# Patient Record
Sex: MALE | Race: WHITE
[De-identification: names, ages, dates, MRNs, and addresses within clinical notes are randomized per-mention and may not be internally consistent; named-entity substitution may affect disease eponyms.]

---

## 2018-08-02 PROBLEM — Z00.00 ENCOUNTER FOR PREVENTIVE HEALTH EXAMINATION: Status: ACTIVE | Noted: 2018-08-02

## 2018-08-20 ENCOUNTER — RESULT REVIEW (OUTPATIENT)
Age: 43
End: 2018-08-20

## 2018-08-30 ENCOUNTER — APPOINTMENT (OUTPATIENT)
Dept: UROLOGY | Facility: CLINIC | Age: 43
End: 2018-08-30
Payer: COMMERCIAL

## 2018-08-30 VITALS
WEIGHT: 178 LBS | DIASTOLIC BLOOD PRESSURE: 87 MMHG | SYSTOLIC BLOOD PRESSURE: 124 MMHG | BODY MASS INDEX: 27.94 KG/M2 | HEIGHT: 67 IN | HEART RATE: 102 BPM

## 2018-08-30 DIAGNOSIS — Z80.0 FAMILY HISTORY OF MALIGNANT NEOPLASM OF DIGESTIVE ORGANS: ICD-10-CM

## 2018-08-30 DIAGNOSIS — Z86.010 PERSONAL HISTORY OF COLONIC POLYPS: ICD-10-CM

## 2018-08-30 DIAGNOSIS — Q60.0 RENAL AGENESIS, UNILATERAL: ICD-10-CM

## 2018-08-30 DIAGNOSIS — Z78.9 OTHER SPECIFIED HEALTH STATUS: ICD-10-CM

## 2018-08-30 DIAGNOSIS — E78.1 PURE HYPERGLYCERIDEMIA: ICD-10-CM

## 2018-08-30 LAB
BILIRUB UR QL STRIP: NORMAL
CLARITY UR: CLEAR
COLLECTION METHOD: NORMAL
GLUCOSE UR-MCNC: NORMAL
HCG UR QL: 1 EU/DL
HGB UR QL STRIP.AUTO: NORMAL
KETONES UR-MCNC: NORMAL
LEUKOCYTE ESTERASE UR QL STRIP: NORMAL
NITRITE UR QL STRIP: NORMAL
PH UR STRIP: 6.5
PROT UR STRIP-MCNC: NORMAL
SP GR UR STRIP: 1.02

## 2018-08-30 PROCEDURE — 81003 URINALYSIS AUTO W/O SCOPE: CPT | Mod: QW

## 2018-08-30 PROCEDURE — 99204 OFFICE O/P NEW MOD 45 MIN: CPT | Mod: 25

## 2018-08-30 PROCEDURE — 51798 US URINE CAPACITY MEASURE: CPT

## 2018-09-07 ENCOUNTER — OUTPATIENT (OUTPATIENT)
Dept: OUTPATIENT SERVICES | Facility: HOSPITAL | Age: 43
LOS: 1 days | End: 2018-09-07
Payer: COMMERCIAL

## 2018-09-07 ENCOUNTER — APPOINTMENT (OUTPATIENT)
Dept: ULTRASOUND IMAGING | Facility: CLINIC | Age: 43
End: 2018-09-07
Payer: COMMERCIAL

## 2018-09-07 DIAGNOSIS — Z00.8 ENCOUNTER FOR OTHER GENERAL EXAMINATION: ICD-10-CM

## 2018-09-07 PROCEDURE — 76870 US EXAM SCROTUM: CPT

## 2018-09-07 PROCEDURE — 76870 US EXAM SCROTUM: CPT | Mod: 26

## 2019-11-07 ENCOUNTER — APPOINTMENT (OUTPATIENT)
Dept: UROLOGY | Facility: CLINIC | Age: 44
End: 2019-11-07
Payer: COMMERCIAL

## 2019-11-07 VITALS
WEIGHT: 180 LBS | HEART RATE: 102 BPM | BODY MASS INDEX: 28.25 KG/M2 | HEIGHT: 67 IN | SYSTOLIC BLOOD PRESSURE: 125 MMHG | RESPIRATION RATE: 14 BRPM | DIASTOLIC BLOOD PRESSURE: 87 MMHG

## 2019-11-07 DIAGNOSIS — N50.812 LEFT TESTICULAR PAIN: ICD-10-CM

## 2019-11-07 PROCEDURE — 99214 OFFICE O/P EST MOD 30 MIN: CPT

## 2019-11-11 NOTE — PHYSICAL EXAM
[Normal Appearance] : normal appearance [General Appearance - Well Developed] : well developed [General Appearance - In No Acute Distress] : no acute distress [Abdomen Mass (___ Cm)] : no abdominal mass palpated [Abdomen Soft] : soft [Abdomen Tenderness] : non-tender [Costovertebral Angle Tenderness] : no ~M costovertebral angle tenderness [Urethral Meatus] : meatus normal [Penis Abnormality] : normal circumcised penis [Scrotum] : the scrotum was normal [Epididymis] : the epididymides were normal [Testes Mass (___cm)] : there were no testicular masses [Prostate Tenderness] : the prostate was not tender [Prostate Size ___ gm] : prostate size [unfilled] gm [No Prostate Nodules] : no prostate nodules [Skin Color & Pigmentation] : normal skin color and pigmentation [FreeTextEntry1] : normal peripheral circulation  [] : no respiratory distress [Normal Station and Gait] : the gait and station were normal for the patient's age [Oriented To Time, Place, And Person] : oriented to person, place, and time [No Palpable Adenopathy] : no palpable adenopathy [No Focal Deficits] : no focal deficits

## 2019-11-11 NOTE — HISTORY OF PRESENT ILLNESS
[FreeTextEntry1] : 43 yo male presents for follow up. \par Reports good stream, urinates every few hours or so during the day depending on fluid: water, coffee/tea and soda intake. Nocturia of 1 x. \par Denies hesitancy, straining, intermittency, urgency, incontinence, sense of incomplete emptying.\par Denies dysuria, hematuria, lower abdominal or flank pain, fever, chills or rigors.\par Has family history Prostate cancer- Father, . \par Denies any recent unintentional weight loss, night sweats and new bone or back pain. \par Still has off and on left testis pain. \par \par Has history of left varicocele and hernia surgery when 8 yrs of age. \par Has solitary kidney. Did left donor nephrectomy for his sister. \par \par

## 2019-11-11 NOTE — PHYSICAL EXAM
[General Appearance - Well Developed] : well developed [Normal Appearance] : normal appearance [General Appearance - In No Acute Distress] : no acute distress [Abdomen Tenderness] : non-tender [Abdomen Soft] : soft [Abdomen Mass (___ Cm)] : no abdominal mass palpated [Costovertebral Angle Tenderness] : no ~M costovertebral angle tenderness [Penis Abnormality] : normal circumcised penis [Urethral Meatus] : meatus normal [Scrotum] : the scrotum was normal [Epididymis] : the epididymides were normal [Testes Mass (___cm)] : there were no testicular masses [Prostate Tenderness] : the prostate was not tender [No Prostate Nodules] : no prostate nodules [Prostate Size ___ gm] : prostate size [unfilled] gm [FreeTextEntry1] : normal peripheral circulation  [] : no respiratory distress [Skin Color & Pigmentation] : normal skin color and pigmentation [Oriented To Time, Place, And Person] : oriented to person, place, and time [Normal Station and Gait] : the gait and station were normal for the patient's age [No Focal Deficits] : no focal deficits [No Palpable Adenopathy] : no palpable adenopathy

## 2019-11-11 NOTE — LETTER BODY
[Please see my note below.] : Please see my note below. [Courtesy Letter:] : I had the pleasure of seeing your patient, [unfilled], in my office today. [Dear  ___] : Dear  [unfilled], [Sincerely,] : Sincerely, [FreeTextEntry3] : Eliot Cali MD\par  of Urology\par [facility]\par \par Offices:\par [facility] @\par [address], [place] [address]\par and\par [address], [place] [address], [address]\par and\par [address] [address]\par \par TEL: [phone]\par FAX: [phone]

## 2019-11-11 NOTE — HISTORY OF PRESENT ILLNESS
[FreeTextEntry1] : 45 yo male presents for follow up. \par Reports good stream, urinates every few hours or so during the day depending on fluid: water, coffee/tea and soda intake. Nocturia of 1 x. \par Denies hesitancy, straining, intermittency, urgency, incontinence, sense of incomplete emptying.\par Denies dysuria, hematuria, lower abdominal or flank pain, fever, chills or rigors.\par Has family history Prostate cancer- Father, . \par Denies any recent unintentional weight loss, night sweats and new bone or back pain. \par Still has off and on left testis pain. \par \par Has history of left varicocele and hernia surgery when 8 yrs of age. \par Has solitary kidney. Did left donor nephrectomy for his sister. \par \par

## 2019-11-11 NOTE — ASSESSMENT
[FreeTextEntry1] : Benign Prostatic Hyperplasia:\par No bothersome lower urinary tract symptoms and minimal post void residual. \par \par Left testis pain:\par Discussed Scrotal Ultrasound: Right trace Hydrocele. \par \par PSA Screening:\par Repeat PSA pending. Will do with Primary care physician and inform us. \par \par Return to office in 1 year or sooner if any issues- will do Uroflo and PVR.

## 2019-11-11 NOTE — LETTER BODY
[Courtesy Letter:] : I had the pleasure of seeing your patient, [unfilled], in my office today. [Please see my note below.] : Please see my note below. [Dear  ___] : Dear  [unfilled], [Sincerely,] : Sincerely, [FreeTextEntry3] : Eliot Cali MD\par  of Urology\par [facility]\par \par Offices:\par [facility] @\par [address], [place] [address]\par and\par [address], [place] [address], [address]\par and\par [address] [address]\par \par TEL: [phone]\par FAX: [phone]

## 2020-12-22 ENCOUNTER — APPOINTMENT (OUTPATIENT)
Dept: UROLOGY | Facility: CLINIC | Age: 45
End: 2020-12-22
Payer: COMMERCIAL

## 2020-12-22 VITALS — DIASTOLIC BLOOD PRESSURE: 100 MMHG | TEMPERATURE: 98.2 F | HEART RATE: 99 BPM | SYSTOLIC BLOOD PRESSURE: 160 MMHG

## 2020-12-22 PROCEDURE — 99214 OFFICE O/P EST MOD 30 MIN: CPT

## 2020-12-22 PROCEDURE — 99072 ADDL SUPL MATRL&STAF TM PHE: CPT

## 2020-12-30 NOTE — LETTER BODY
[Dear  ___] : Dear  [unfilled], [Courtesy Letter:] : I had the pleasure of seeing your patient, [unfilled], in my office today. [Please see my note below.] : Please see my note below. [Sincerely,] : Sincerely, [FreeTextEntry3] : Eliot Cali MD\par  of Urology\par [facility]\par \par Offices:\par [facility] @\par [address], [place] [address]\par and\par [address], [place] [address], [address]\par and\par [address] [address]\par \par TEL: [phone]\par FAX: [phone]

## 2020-12-30 NOTE — HISTORY OF PRESENT ILLNESS
[FreeTextEntry1] : 44 yo male presents for follow up. \par Had colonoscopy was told has enlarged prostate. \par Prescribed Flomax, did not start. \par Urinates with weaker stream, every few hours or so during the day and nocturia 2-3 x\par Denies dysuria, hematuria, lower abdominal or flank pain, nausea, vomiting, fever, chills or rigors. \par Normal erections and libido. \par \par Has family history Prostate cancer- Father, . \par \par Has history of left varicocele surgery when 8 yo and hernia surgery in . \par Has solitary kidney. Did left donor nephrectomy for his sister. \par \par

## 2020-12-30 NOTE — PHYSICAL EXAM
[Urethral Meatus] : meatus normal [Penis Abnormality] : normal circumcised penis [Scrotum] : the scrotum was normal [Epididymis] : the epididymides were normal [Testes Mass (___cm)] : there were no testicular masses [Prostate Tenderness] : the prostate was not tender [No Prostate Nodules] : no prostate nodules [Prostate Size ___ gm] : prostate size [unfilled] gm [Normal Appearance] : normal appearance [General Appearance - In No Acute Distress] : no acute distress [Abdomen Soft] : soft [Abdomen Tenderness] : non-tender [Costovertebral Angle Tenderness] : no ~M costovertebral angle tenderness [Skin Color & Pigmentation] : normal skin color and pigmentation [FreeTextEntry1] : normal peripheral circulation  [] : no respiratory distress [Oriented To Time, Place, And Person] : oriented to person, place, and time [Normal Station and Gait] : the gait and station were normal for the patient's age [No Focal Deficits] : no focal deficits

## 2020-12-30 NOTE — ASSESSMENT
[FreeTextEntry1] : Benign Prostatic Hyperplasia:\par Discussed treatment options. \par Prescribed Flomax. Explained common side effects: nasal congestion, cough, muscle pain, back pain, dizziness, orthostatic hypotension, somnolence, retrograde ejaculation, decreased libido and erectile dysfunction.\par  \par PSA Screening:\par Discussed PSA screening and latest recommendations/guidelines- USPTF and AUA. \par PSA before appointment. \par \par Return to office in 2 months or sooner if any issues- will do Uroflo/PVR.

## 2021-02-16 ENCOUNTER — APPOINTMENT (OUTPATIENT)
Dept: UROLOGY | Facility: CLINIC | Age: 46
End: 2021-02-16

## 2021-02-23 ENCOUNTER — APPOINTMENT (OUTPATIENT)
Dept: UROLOGY | Facility: CLINIC | Age: 46
End: 2021-02-23
Payer: COMMERCIAL

## 2021-02-23 DIAGNOSIS — R35.1 NOCTURIA: ICD-10-CM

## 2021-02-23 PROCEDURE — 99072 ADDL SUPL MATRL&STAF TM PHE: CPT

## 2021-02-23 PROCEDURE — 51741 ELECTRO-UROFLOWMETRY FIRST: CPT

## 2021-02-23 PROCEDURE — 99213 OFFICE O/P EST LOW 20 MIN: CPT | Mod: 25

## 2021-02-23 PROCEDURE — 51798 US URINE CAPACITY MEASURE: CPT | Mod: 59

## 2021-02-24 PROBLEM — R35.1 NOCTURIA: Status: ACTIVE | Noted: 2018-08-30

## 2021-02-24 NOTE — PHYSICAL EXAM
[Normal Appearance] : normal appearance [General Appearance - In No Acute Distress] : no acute distress [Abdomen Soft] : soft [Abdomen Tenderness] : non-tender [Skin Color & Pigmentation] : normal skin color and pigmentation [FreeTextEntry1] : normal peripheral circulation  [] : no respiratory distress [Oriented To Time, Place, And Person] : oriented to person, place, and time

## 2021-02-24 NOTE — ASSESSMENT
[FreeTextEntry1] : Benign Prostatic Hyperplasia:\par Nocturia:\par See Uroflo and PVR. \par Discussed treatment options. Offered to change Flomax to Alfuzosin. \par Will restart Flomax. \par \par Return to office in 3 months or sooner if any issues- will do Uroflo/PVR \par

## 2021-02-24 NOTE — HISTORY OF PRESENT ILLNESS
[FreeTextEntry1] : 44 yo male presents for follow up. \par Took Flomax until 4 days ago, stopped as he had developed itching and red spots- not better. \par Did not notice change in flow but was nocturia 1 x. \par \par Recently was seen for enlarged prostate. \par Had colonoscopy was told has enlarged prostate. \par Prescribed Flomax, did not start. \par Urinated with weaker stream, every few hours or so during the day and nocturia 2-3 x\par Denied dysuria, hematuria, lower abdominal or flank pain, nausea, vomiting, fever, chills or rigors. \par Normal erections and libido. \par \par Has family history Prostate cancer- Father, . \par \par Has history of left varicocele surgery when 8 yo and hernia surgery in . \par Has solitary kidney. Did left donor nephrectomy for his sister. \par \par

## 2021-03-12 ENCOUNTER — APPOINTMENT (OUTPATIENT)
Dept: PULMONOLOGY | Facility: CLINIC | Age: 46
End: 2021-03-12
Payer: COMMERCIAL

## 2021-03-12 VITALS
SYSTOLIC BLOOD PRESSURE: 140 MMHG | HEART RATE: 107 BPM | HEIGHT: 67 IN | WEIGHT: 182 LBS | BODY MASS INDEX: 28.56 KG/M2 | DIASTOLIC BLOOD PRESSURE: 80 MMHG | OXYGEN SATURATION: 97 %

## 2021-03-12 PROCEDURE — 99204 OFFICE O/P NEW MOD 45 MIN: CPT

## 2021-03-12 PROCEDURE — 99072 ADDL SUPL MATRL&STAF TM PHE: CPT

## 2021-03-17 NOTE — CONSULT LETTER
[Dear  ___] : Dear  [unfilled], [FreeTextEntry1] : I had the pleasure of evaluating your patient, DESIRE LOPEZ , in the office today.  Please review my consultation and evaluation report that follows below.  Please do not hesitate to call me if further information is necessary or if you wish to discuss ongoing care or diagnostic work-up.   \par I very much appreciate your referral and it is a privilege to be able to provide care for your patient.\par \par Sincerely,\par  \par Bhargav Davidson MD, MHCM, FACP, ANDRESSA-C\par Pulmonary Medicine\par  of Medicine\par [facility]\par [email or web address]\par \par [facility] in [place]\par [address]\[place]  [address]\par    Fax \par \par [facility] at [place]\par [address]\par [place], [place] \par \par

## 2021-03-17 NOTE — ASSESSMENT
[FreeTextEntry1] : Very pleasant gentleman with hx HLD and is sister has alpha1 antitryp with pulmonary/heaptic disease, also renal failure and colon cancer\par \par He is a nonsmoker with no pulmonary complaints, no hx of liver disease\par \par Measured alpha one is low (77)\par Suspected abnormalities of his lung parenchymal\par \par At this time, it is unclear whether he has alph one deficiency  capable of causing disease \par Additionally, time course of development of disease is frequently variable.\par Need dedicated CT chest for further evaluation\par Request PFTs\par RTC one month

## 2021-03-17 NOTE — HISTORY OF PRESENT ILLNESS
[TextBox_4] : 45 yo man presents for evaluation of probable alpha one antitrypsin disease\par \par Patients sister has alpha 1, has emphysema and liver function abn, sp renal transplant\par His alpha one level is 77 mg/deci\par \par Non smoker with no history of pulmonary disease\par  \par S/p renal donation to sister\par \par Hx hypertriglycerides\par \par Recent cardiac exam showed neg ECHO and Ca score negative\par Review of CT heart suggests small emphysema changes on visible lung\par \par Patient  with step children\par Owns a pool complany\par Active, exercises

## 2021-04-13 ENCOUNTER — APPOINTMENT (OUTPATIENT)
Dept: DISASTER EMERGENCY | Facility: CLINIC | Age: 46
End: 2021-04-13

## 2021-04-14 LAB — SARS-COV-2 N GENE NPH QL NAA+PROBE: NOT DETECTED

## 2021-04-16 ENCOUNTER — APPOINTMENT (OUTPATIENT)
Dept: PULMONOLOGY | Facility: CLINIC | Age: 46
End: 2021-04-16
Payer: COMMERCIAL

## 2021-04-16 VITALS — WEIGHT: 180 LBS | TEMPERATURE: 98.3 F | BODY MASS INDEX: 28.59 KG/M2 | HEIGHT: 66.5 IN

## 2021-04-16 VITALS
RESPIRATION RATE: 16 BRPM | OXYGEN SATURATION: 97 % | DIASTOLIC BLOOD PRESSURE: 90 MMHG | SYSTOLIC BLOOD PRESSURE: 124 MMHG | HEART RATE: 88 BPM

## 2021-04-16 DIAGNOSIS — E88.01 ALPHA-1-ANTITRYPSIN DEFICIENCY: ICD-10-CM

## 2021-04-16 DIAGNOSIS — Z01.818 ENCOUNTER FOR OTHER PREPROCEDURAL EXAMINATION: ICD-10-CM

## 2021-04-16 PROCEDURE — 94010 BREATHING CAPACITY TEST: CPT

## 2021-04-16 PROCEDURE — 99072 ADDL SUPL MATRL&STAF TM PHE: CPT

## 2021-04-16 PROCEDURE — 99214 OFFICE O/P EST MOD 30 MIN: CPT | Mod: 25

## 2021-04-16 PROCEDURE — 94727 GAS DIL/WSHOT DETER LNG VOL: CPT

## 2021-04-16 PROCEDURE — 94729 DIFFUSING CAPACITY: CPT

## 2021-04-16 PROCEDURE — 85018 HEMOGLOBIN: CPT | Mod: QW

## 2021-04-16 RX ORDER — ATORVASTATIN CALCIUM 20 MG/1
20 TABLET, FILM COATED ORAL
Refills: 0 | Status: ACTIVE | COMMUNITY

## 2021-04-16 RX ORDER — TAMSULOSIN HYDROCHLORIDE 0.4 MG/1
0.4 CAPSULE ORAL
Refills: 0 | Status: ACTIVE | COMMUNITY

## 2021-04-16 RX ORDER — FENOFIBRATE 145 MG/1
TABLET ORAL
Refills: 0 | Status: DISCONTINUED | COMMUNITY
End: 2021-04-16

## 2021-04-16 NOTE — CONSULT LETTER
[Dear  ___] : Dear  [unfilled], [FreeTextEntry1] : I had the pleasure of evaluating your patient, DESIRE LOPEZ , in the office today.  Please review my consultation and evaluation report that follows below.  Please do not hesitate to call me if further information is necessary or if you wish to discuss ongoing care or diagnostic work-up.   \par I very much appreciate your referral and it is a privilege to be able to provide care for your patient.\par \par Sincerely,\par  \par Bhargav Davidson MD, MHCM, FACP, ANDRESSA-C\par Pulmonary Medicine\par  of Medicine\par [facility]\par [email or web address]\par \par [facility] in [place]\par [address]\[place]  [address]\par    Fax \par \par [facility] at [place]\par [address]\par [place], [place] \par \par

## 2021-04-16 NOTE — ASSESSMENT
[FreeTextEntry1] : 47 yo man with FH of alpha one antitrypsin\par Patient donated kidney to his sister\par Noted to have low alpha one levels  70 mg/dl\par \par PFTs obtained today are NORMAl, no restriction or obstruction\par Full CT chest shows no evidence of emphysematous changes\par \par Note that clinical pulmonary disease not seen until alpha one is less than 57 mg/dl\par Patient is a nonsmoker\par \par At this time, altho the patient may be heterozygous for alpha one abnormality, he has no evidence of pulmonary dysfunction\par No further w/u is indicated\par Will be most happy to see again as needed

## 2021-04-16 NOTE — HISTORY OF PRESENT ILLNESS
[TextBox_4] : Very pleasant gentleman whose sister has clinical alpha 1 def and he was noted to have low levels  (77 mg/dl)\par Recent brought back for PFTs and to review Chest CT

## 2021-05-18 ENCOUNTER — APPOINTMENT (OUTPATIENT)
Dept: UROLOGY | Facility: CLINIC | Age: 46
End: 2021-05-18
Payer: COMMERCIAL

## 2021-05-18 VITALS — TEMPERATURE: 97.3 F

## 2021-05-18 DIAGNOSIS — N13.8 BENIGN PROSTATIC HYPERPLASIA WITH LOWER URINARY TRACT SYMPMS: ICD-10-CM

## 2021-05-18 DIAGNOSIS — Z12.5 ENCOUNTER FOR SCREENING FOR MALIGNANT NEOPLASM OF PROSTATE: ICD-10-CM

## 2021-05-18 DIAGNOSIS — N40.1 BENIGN PROSTATIC HYPERPLASIA WITH LOWER URINARY TRACT SYMPMS: ICD-10-CM

## 2021-05-18 PROCEDURE — 51741 ELECTRO-UROFLOWMETRY FIRST: CPT

## 2021-05-18 PROCEDURE — 51798 US URINE CAPACITY MEASURE: CPT

## 2021-05-18 PROCEDURE — 99213 OFFICE O/P EST LOW 20 MIN: CPT | Mod: 25

## 2021-05-24 PROBLEM — Z12.5 SCREENING PSA (PROSTATE SPECIFIC ANTIGEN): Status: ACTIVE | Noted: 2018-08-30

## 2021-05-24 PROBLEM — N40.1 BPH WITH OBSTRUCTION/LOWER URINARY TRACT SYMPTOMS: Status: ACTIVE | Noted: 2018-08-30

## 2021-05-24 NOTE — HISTORY OF PRESENT ILLNESS
[FreeTextEntry1] : 47 yo male presents for follow up. \par Taking Flomax. Nocturia 1 x earlier 2-3 x. \par When drinks coffee urinates every hour or so, otherwise every 3-4 hours.\par \par Recently was seen for enlarged prostate. \par Had colonoscopy was told has enlarged prostate. \par Prescribed Flomax, did not start. \par Urinated with weaker stream, every few hours or so during the day and nocturia 2-3 x\par Denied dysuria, hematuria, lower abdominal or flank pain, nausea, vomiting, fever, chills or rigors. \par Normal erections and libido. \par \par Has family history Prostate cancer- Father, . \par \par Has history of left varicocele surgery when 8 yo and hernia surgery in . \par Has solitary kidney. Did left donor nephrectomy for his sister. \par \par

## 2021-05-24 NOTE — LETTER BODY
[Dear  ___] : Dear  [unfilled], [Courtesy Letter:] : I had the pleasure of seeing your patient, [unfilled], in my office today. [Please see my note below.] : Please see my note below. [Sincerely,] : Sincerely, [FreeTextEntry3] : Eliot Cali MD\par  of Urology\par [facility]\par \par Offices:\par [facility] @\par [address], [place] [address]\par and\par [address] [place] [address], [address]\par and\par [address] [address]\par \par TEL: [phone]\par FAX: [phone]

## 2021-05-24 NOTE — ASSESSMENT
[FreeTextEntry1] : Benign Prostatic Hyperplasia:\par See Uroflo and PVR. \par Not sure if he wants to continue Flomax. Will think about it.\par \par PSA Screening:\par WIll do PSA with PCP. \par \par Return to office in Dec 2021 or sooner if any issues- will do Uroflo/PVR. \par \par

## 2021-12-14 ENCOUNTER — APPOINTMENT (OUTPATIENT)
Dept: UROLOGY | Facility: CLINIC | Age: 46
End: 2021-12-14

## 2024-12-13 ENCOUNTER — APPOINTMENT (OUTPATIENT)
Dept: UROLOGY | Facility: CLINIC | Age: 49
End: 2024-12-13
Payer: COMMERCIAL

## 2024-12-13 VITALS
BODY MASS INDEX: 28.72 KG/M2 | OXYGEN SATURATION: 98 % | HEIGHT: 67 IN | DIASTOLIC BLOOD PRESSURE: 99 MMHG | RESPIRATION RATE: 17 BRPM | HEART RATE: 90 BPM | SYSTOLIC BLOOD PRESSURE: 145 MMHG | WEIGHT: 183 LBS

## 2024-12-13 DIAGNOSIS — N13.8 BENIGN PROSTATIC HYPERPLASIA WITH LOWER URINARY TRACT SYMPMS: ICD-10-CM

## 2024-12-13 DIAGNOSIS — R39.15 URGENCY OF URINATION: ICD-10-CM

## 2024-12-13 DIAGNOSIS — N40.1 BENIGN PROSTATIC HYPERPLASIA WITH LOWER URINARY TRACT SYMPMS: ICD-10-CM

## 2024-12-13 DIAGNOSIS — R33.9 RETENTION OF URINE, UNSPECIFIED: ICD-10-CM

## 2024-12-13 DIAGNOSIS — Z12.5 ENCOUNTER FOR SCREENING FOR MALIGNANT NEOPLASM OF PROSTATE: ICD-10-CM

## 2024-12-13 PROCEDURE — 99204 OFFICE O/P NEW MOD 45 MIN: CPT

## 2024-12-13 RX ORDER — TAMSULOSIN HYDROCHLORIDE 0.4 MG/1
0.4 CAPSULE ORAL
Qty: 90 | Refills: 1 | Status: ACTIVE | COMMUNITY
Start: 2024-12-13 | End: 1900-01-01

## 2025-04-09 ENCOUNTER — APPOINTMENT (OUTPATIENT)
Dept: UROLOGY | Facility: CLINIC | Age: 50
End: 2025-04-09
Payer: COMMERCIAL

## 2025-04-09 VITALS
HEIGHT: 67.5 IN | SYSTOLIC BLOOD PRESSURE: 141 MMHG | WEIGHT: 181 LBS | HEART RATE: 98 BPM | RESPIRATION RATE: 17 BRPM | OXYGEN SATURATION: 97 % | BODY MASS INDEX: 28.08 KG/M2 | DIASTOLIC BLOOD PRESSURE: 102 MMHG

## 2025-04-09 DIAGNOSIS — Z12.5 ENCOUNTER FOR SCREENING FOR MALIGNANT NEOPLASM OF PROSTATE: ICD-10-CM

## 2025-04-09 PROCEDURE — 51741 ELECTRO-UROFLOWMETRY FIRST: CPT

## 2025-04-09 PROCEDURE — 99214 OFFICE O/P EST MOD 30 MIN: CPT | Mod: 25

## 2025-04-11 ENCOUNTER — APPOINTMENT (OUTPATIENT)
Dept: ULTRASOUND IMAGING | Facility: CLINIC | Age: 50
End: 2025-04-11

## 2025-04-11 ENCOUNTER — OUTPATIENT (OUTPATIENT)
Dept: OUTPATIENT SERVICES | Facility: HOSPITAL | Age: 50
LOS: 1 days | End: 2025-04-11
Payer: COMMERCIAL

## 2025-04-11 DIAGNOSIS — R33.9 RETENTION OF URINE, UNSPECIFIED: ICD-10-CM

## 2025-04-11 DIAGNOSIS — N40.1 BENIGN PROSTATIC HYPERPLASIA WITH LOWER URINARY TRACT SYMPTOMS: ICD-10-CM

## 2025-04-11 LAB
ANION GAP SERPL CALC-SCNC: 14 MMOL/L
APPEARANCE: CLEAR
BACTERIA UR CULT: NORMAL
BACTERIA: NEGATIVE /HPF
BILIRUBIN URINE: NEGATIVE
BLOOD URINE: NEGATIVE
BUN SERPL-MCNC: 21 MG/DL
CALCIUM SERPL-MCNC: 9.8 MG/DL
CAST: 0 /LPF
CHLORIDE SERPL-SCNC: 103 MMOL/L
CO2 SERPL-SCNC: 26 MMOL/L
COLOR: YELLOW
CREAT SERPL-MCNC: 1.6 MG/DL
EGFRCR SERPLBLD CKD-EPI 2021: 52 ML/MIN/1.73M2
EPITHELIAL CELLS: 0 /HPF
GLUCOSE QUALITATIVE U: NEGATIVE MG/DL
GLUCOSE SERPL-MCNC: 83 MG/DL
KETONES URINE: NEGATIVE MG/DL
LEUKOCYTE ESTERASE URINE: NEGATIVE
MICROSCOPIC-UA: NORMAL
NITRITE URINE: NEGATIVE
PH URINE: 6.5
POTASSIUM SERPL-SCNC: 4.9 MMOL/L
PROTEIN URINE: NEGATIVE MG/DL
PSA SERPL-MCNC: 3.03 NG/ML
RED BLOOD CELLS URINE: 0 /HPF
SODIUM SERPL-SCNC: 143 MMOL/L
SPECIFIC GRAVITY URINE: 1.01
UROBILINOGEN URINE: 0.2 MG/DL
WHITE BLOOD CELLS URINE: 0 /HPF

## 2025-04-11 PROCEDURE — 76770 US EXAM ABDO BACK WALL COMP: CPT | Mod: 26

## 2025-04-11 PROCEDURE — 76770 US EXAM ABDO BACK WALL COMP: CPT

## 2025-04-16 ENCOUNTER — APPOINTMENT (OUTPATIENT)
Dept: UROLOGY | Facility: CLINIC | Age: 50
End: 2025-04-16
Payer: COMMERCIAL

## 2025-04-16 VITALS
HEART RATE: 118 BPM | BODY MASS INDEX: 28.25 KG/M2 | SYSTOLIC BLOOD PRESSURE: 155 MMHG | WEIGHT: 180 LBS | RESPIRATION RATE: 17 BRPM | DIASTOLIC BLOOD PRESSURE: 110 MMHG | OXYGEN SATURATION: 97 % | HEIGHT: 67 IN

## 2025-04-16 DIAGNOSIS — N13.8 BENIGN PROSTATIC HYPERPLASIA WITH LOWER URINARY TRACT SYMPMS: ICD-10-CM

## 2025-04-16 DIAGNOSIS — N40.1 BENIGN PROSTATIC HYPERPLASIA WITH LOWER URINARY TRACT SYMPMS: ICD-10-CM

## 2025-04-16 DIAGNOSIS — R97.20 ELEVATED PROSTATE, SPECIFIC ANTIGEN [PSA]: ICD-10-CM

## 2025-04-16 DIAGNOSIS — R33.9 RETENTION OF URINE, UNSPECIFIED: ICD-10-CM

## 2025-04-16 PROCEDURE — 99214 OFFICE O/P EST MOD 30 MIN: CPT | Mod: 25

## 2025-04-16 PROCEDURE — 52000 CYSTOURETHROSCOPY: CPT

## 2025-04-29 ENCOUNTER — APPOINTMENT (OUTPATIENT)
Dept: MRI IMAGING | Facility: CLINIC | Age: 50
End: 2025-04-29

## 2025-04-29 ENCOUNTER — OUTPATIENT (OUTPATIENT)
Dept: OUTPATIENT SERVICES | Facility: HOSPITAL | Age: 50
LOS: 1 days | End: 2025-04-29
Payer: COMMERCIAL

## 2025-04-29 ENCOUNTER — RESULT REVIEW (OUTPATIENT)
Age: 50
End: 2025-04-29

## 2025-04-29 DIAGNOSIS — R97.20 ELEVATED PROSTATE SPECIFIC ANTIGEN [PSA]: ICD-10-CM

## 2025-04-29 PROCEDURE — 76498 UNLISTED MR PROCEDURE: CPT

## 2025-04-29 PROCEDURE — 72197 MRI PELVIS W/O & W/DYE: CPT | Mod: 26

## 2025-04-29 PROCEDURE — A9585: CPT

## 2025-04-29 PROCEDURE — 76498P: CUSTOM | Mod: 26

## 2025-04-29 PROCEDURE — 72197 MRI PELVIS W/O & W/DYE: CPT

## 2025-05-02 ENCOUNTER — OUTPATIENT (OUTPATIENT)
Dept: OUTPATIENT SERVICES | Facility: HOSPITAL | Age: 50
LOS: 1 days | End: 2025-05-02
Payer: COMMERCIAL

## 2025-05-02 VITALS
HEART RATE: 90 BPM | HEIGHT: 67.5 IN | DIASTOLIC BLOOD PRESSURE: 92 MMHG | WEIGHT: 176.37 LBS | OXYGEN SATURATION: 98 % | SYSTOLIC BLOOD PRESSURE: 132 MMHG | TEMPERATURE: 98 F | RESPIRATION RATE: 18 BRPM

## 2025-05-02 DIAGNOSIS — Z01.818 ENCOUNTER FOR OTHER PREPROCEDURAL EXAMINATION: ICD-10-CM

## 2025-05-02 DIAGNOSIS — Z98.890 OTHER SPECIFIED POSTPROCEDURAL STATES: Chronic | ICD-10-CM

## 2025-05-02 DIAGNOSIS — N40.1 BENIGN PROSTATIC HYPERPLASIA WITH LOWER URINARY TRACT SYMPTOMS: ICD-10-CM

## 2025-05-02 DIAGNOSIS — Z90.5 ACQUIRED ABSENCE OF KIDNEY: Chronic | ICD-10-CM

## 2025-05-02 DIAGNOSIS — I86.1 SCROTAL VARICES: Chronic | ICD-10-CM

## 2025-05-02 DIAGNOSIS — E78.1 PURE HYPERGLYCERIDEMIA: ICD-10-CM

## 2025-05-02 LAB
ANION GAP SERPL CALC-SCNC: 7 MMOL/L — SIGNIFICANT CHANGE UP (ref 5–17)
APPEARANCE UR: CLEAR — SIGNIFICANT CHANGE UP
APTT BLD: 35.1 SEC — SIGNIFICANT CHANGE UP (ref 26.1–36.8)
BASOPHILS # BLD AUTO: 0.02 K/UL — SIGNIFICANT CHANGE UP (ref 0–0.2)
BASOPHILS NFR BLD AUTO: 0.4 % — SIGNIFICANT CHANGE UP (ref 0–2)
BILIRUB UR-MCNC: NEGATIVE — SIGNIFICANT CHANGE UP
BLD GP AB SCN SERPL QL: SIGNIFICANT CHANGE UP
BUN SERPL-MCNC: 16 MG/DL — SIGNIFICANT CHANGE UP (ref 7–23)
CALCIUM SERPL-MCNC: 9.5 MG/DL — SIGNIFICANT CHANGE UP (ref 8.4–10.5)
CHLORIDE SERPL-SCNC: 106 MMOL/L — SIGNIFICANT CHANGE UP (ref 96–108)
CO2 SERPL-SCNC: 28 MMOL/L — SIGNIFICANT CHANGE UP (ref 22–31)
COLOR SPEC: YELLOW — SIGNIFICANT CHANGE UP
CREAT SERPL-MCNC: 1.17 MG/DL — SIGNIFICANT CHANGE UP (ref 0.5–1.3)
DIFF PNL FLD: NEGATIVE — SIGNIFICANT CHANGE UP
EGFR: 75 ML/MIN/1.73M2 — SIGNIFICANT CHANGE UP
EGFR: 75 ML/MIN/1.73M2 — SIGNIFICANT CHANGE UP
EOSINOPHIL # BLD AUTO: 0.19 K/UL — SIGNIFICANT CHANGE UP (ref 0–0.5)
EOSINOPHIL NFR BLD AUTO: 3.8 % — SIGNIFICANT CHANGE UP (ref 0–6)
GLUCOSE SERPL-MCNC: 105 MG/DL — HIGH (ref 70–99)
GLUCOSE UR QL: NEGATIVE MG/DL — SIGNIFICANT CHANGE UP
HCT VFR BLD CALC: 44.5 % — SIGNIFICANT CHANGE UP (ref 39–50)
HGB BLD-MCNC: 15.7 G/DL — SIGNIFICANT CHANGE UP (ref 13–17)
IMM GRANULOCYTES NFR BLD AUTO: 1 % — HIGH (ref 0–0.9)
INR BLD: 0.87 RATIO — SIGNIFICANT CHANGE UP (ref 0.85–1.16)
KETONES UR-MCNC: NEGATIVE MG/DL — SIGNIFICANT CHANGE UP
LEUKOCYTE ESTERASE UR-ACNC: NEGATIVE — SIGNIFICANT CHANGE UP
LYMPHOCYTES # BLD AUTO: 1.53 K/UL — SIGNIFICANT CHANGE UP (ref 1–3.3)
LYMPHOCYTES # BLD AUTO: 30.9 % — SIGNIFICANT CHANGE UP (ref 13–44)
MCHC RBC-ENTMCNC: 29.1 PG — SIGNIFICANT CHANGE UP (ref 27–34)
MCHC RBC-ENTMCNC: 35.3 G/DL — SIGNIFICANT CHANGE UP (ref 32–36)
MCV RBC AUTO: 82.6 FL — SIGNIFICANT CHANGE UP (ref 80–100)
MONOCYTES # BLD AUTO: 0.3 K/UL — SIGNIFICANT CHANGE UP (ref 0–0.9)
MONOCYTES NFR BLD AUTO: 6.1 % — SIGNIFICANT CHANGE UP (ref 2–14)
NEUTROPHILS # BLD AUTO: 2.86 K/UL — SIGNIFICANT CHANGE UP (ref 1.8–7.4)
NEUTROPHILS NFR BLD AUTO: 57.8 % — SIGNIFICANT CHANGE UP (ref 43–77)
NITRITE UR-MCNC: NEGATIVE — SIGNIFICANT CHANGE UP
NRBC BLD AUTO-RTO: 0 /100 WBCS — SIGNIFICANT CHANGE UP (ref 0–0)
PH UR: 6.5 — SIGNIFICANT CHANGE UP (ref 5–8)
PLATELET # BLD AUTO: 205 K/UL — SIGNIFICANT CHANGE UP (ref 150–400)
POTASSIUM SERPL-MCNC: 4.3 MMOL/L — SIGNIFICANT CHANGE UP (ref 3.5–5.3)
POTASSIUM SERPL-SCNC: 4.3 MMOL/L — SIGNIFICANT CHANGE UP (ref 3.5–5.3)
PROT UR-MCNC: NEGATIVE MG/DL — SIGNIFICANT CHANGE UP
PROTHROM AB SERPL-ACNC: 10.3 SEC — SIGNIFICANT CHANGE UP (ref 9.9–13.4)
RBC # BLD: 5.39 M/UL — SIGNIFICANT CHANGE UP (ref 4.2–5.8)
RBC # FLD: 12.4 % — SIGNIFICANT CHANGE UP (ref 10.3–14.5)
SODIUM SERPL-SCNC: 141 MMOL/L — SIGNIFICANT CHANGE UP (ref 135–145)
SP GR SPEC: 1.01 — SIGNIFICANT CHANGE UP (ref 1–1.03)
UROBILINOGEN FLD QL: 0.2 MG/DL — SIGNIFICANT CHANGE UP (ref 0.2–1)
WBC # BLD: 4.95 K/UL — SIGNIFICANT CHANGE UP (ref 3.8–10.5)
WBC # FLD AUTO: 4.95 K/UL — SIGNIFICANT CHANGE UP (ref 3.8–10.5)

## 2025-05-02 PROCEDURE — 86901 BLOOD TYPING SEROLOGIC RH(D): CPT

## 2025-05-02 PROCEDURE — 80048 BASIC METABOLIC PNL TOTAL CA: CPT

## 2025-05-02 PROCEDURE — 85025 COMPLETE CBC W/AUTO DIFF WBC: CPT

## 2025-05-02 PROCEDURE — 85730 THROMBOPLASTIN TIME PARTIAL: CPT

## 2025-05-02 PROCEDURE — 36415 COLL VENOUS BLD VENIPUNCTURE: CPT

## 2025-05-02 PROCEDURE — 87086 URINE CULTURE/COLONY COUNT: CPT

## 2025-05-02 PROCEDURE — 93005 ELECTROCARDIOGRAM TRACING: CPT

## 2025-05-02 PROCEDURE — G0463: CPT

## 2025-05-02 PROCEDURE — 81003 URINALYSIS AUTO W/O SCOPE: CPT

## 2025-05-02 PROCEDURE — 93010 ELECTROCARDIOGRAM REPORT: CPT | Mod: NC

## 2025-05-02 PROCEDURE — 85610 PROTHROMBIN TIME: CPT

## 2025-05-02 PROCEDURE — 86850 RBC ANTIBODY SCREEN: CPT

## 2025-05-02 PROCEDURE — 86900 BLOOD TYPING SEROLOGIC ABO: CPT

## 2025-05-02 RX ORDER — TAMSULOSIN HYDROCHLORIDE 0.4 MG/1
1 CAPSULE ORAL
Refills: 0 | DISCHARGE

## 2025-05-02 RX ORDER — GEMFIBROZIL 600 MG/1
1 TABLET, FILM COATED ORAL
Refills: 0 | DISCHARGE

## 2025-05-02 RX ORDER — ICOSAPENT ETHYL 500 MG/1
2 CAPSULE ORAL
Refills: 0 | DISCHARGE

## 2025-05-02 NOTE — H&P PST ADULT - OPHTHALMOLOGIC COMMENTS
[de-identified] : Vaginal  delivery   Mat  Hx  of  Anemia, Cigarette/drug  use (Suboxone )    Maternal heroin  use( last use  3  days PTD)  + maternal urine for  opiates     Meconium  and Category  2  FHT   \par Apgars 9/9  [de-identified] : RODRIGUE    MSSA     Diaper Dermatitis    CPS  wears readers No

## 2025-05-02 NOTE — H&P PST ADULT - NSICDXFAMILYHX_GEN_ALL_CORE_FT
FAMILY HISTORY:  Family history of skin cancer    Father  Still living? Unknown  Family history of prostate cancer, Age at diagnosis: Age Unknown    Sibling  Still living? Unknown  Family history of colon cancer, Age at diagnosis: Age Unknown  Family history of thyroid cancer, Age at diagnosis: Age Unknown

## 2025-05-02 NOTE — H&P PST ADULT - NSICDXPASTSURGICALHX_GEN_ALL_CORE_FT
PAST SURGICAL HISTORY:  H/O colonoscopy with polypectomy     History of hernia repair     Single kidney     Varicocele

## 2025-05-02 NOTE — H&P PST ADULT - HISTORY OF PRESENT ILLNESS
Patient is a 50 year old M with PMHx of triglycerides and bph presents for perioperative testing for transurethral water jet ablation of prostate with Dr. Cali scheduled for 05/08/2025. Reports nocturia not being able to empty out his bladder completely d/t enlarged prostate, therefore having upcoming surgery. Denies dysuria, hematuria flank pain or any acute symptoms at this time. Patient otherwise feels well overall.

## 2025-05-02 NOTE — H&P PST ADULT - NSICDXPASTMEDICALHX_GEN_ALL_CORE_FT
PAST MEDICAL HISTORY:  Enlarged prostate with lower urinary tract symptoms (LUTS)     High triglycerides

## 2025-05-03 LAB
CULTURE RESULTS: NO GROWTH — SIGNIFICANT CHANGE UP
SPECIMEN SOURCE: SIGNIFICANT CHANGE UP

## 2025-05-07 ENCOUNTER — NON-APPOINTMENT (OUTPATIENT)
Age: 50
End: 2025-05-07

## 2025-05-13 ENCOUNTER — APPOINTMENT (OUTPATIENT)
Dept: UROLOGY | Facility: CLINIC | Age: 50
End: 2025-05-13

## 2025-05-21 PROBLEM — E78.1 PURE HYPERGLYCERIDEMIA: Chronic | Status: ACTIVE | Noted: 2025-05-02

## 2025-05-21 PROBLEM — N40.1 BENIGN PROSTATIC HYPERPLASIA WITH LOWER URINARY TRACT SYMPTOMS: Chronic | Status: ACTIVE | Noted: 2025-05-02

## 2025-06-09 RX ORDER — CALAMINE 8% AND ZINC OXIDE 8% 160 MG/ML
1 LOTION TOPICAL
Refills: 0 | DISCHARGE
Start: 2025-06-09

## 2025-06-10 RX ORDER — DIBASIC SODIUM PHOSPHATE, MONOBASIC SODIUM PHOSPHATE 7; 19 G/118ML; G/118ML
7-19 ENEMA RECTAL
Qty: 2 | Refills: 0 | Status: ACTIVE | COMMUNITY
Start: 2025-06-10 | End: 1900-01-01

## 2025-06-12 ENCOUNTER — APPOINTMENT (OUTPATIENT)
Dept: UROLOGY | Facility: HOSPITAL | Age: 50
End: 2025-06-12

## 2025-06-12 ENCOUNTER — OUTPATIENT (OUTPATIENT)
Dept: OUTPATIENT SERVICES | Facility: HOSPITAL | Age: 50
LOS: 1 days | End: 2025-06-12
Payer: COMMERCIAL

## 2025-06-12 ENCOUNTER — TRANSCRIPTION ENCOUNTER (OUTPATIENT)
Age: 50
End: 2025-06-12

## 2025-06-12 VITALS
DIASTOLIC BLOOD PRESSURE: 89 MMHG | RESPIRATION RATE: 15 BRPM | TEMPERATURE: 98 F | OXYGEN SATURATION: 97 % | WEIGHT: 176.37 LBS | SYSTOLIC BLOOD PRESSURE: 126 MMHG | HEART RATE: 95 BPM | HEIGHT: 67.5 IN

## 2025-06-12 DIAGNOSIS — N40.1 BENIGN PROSTATIC HYPERPLASIA WITH LOWER URINARY TRACT SYMPTOMS: ICD-10-CM

## 2025-06-12 DIAGNOSIS — Z98.890 OTHER SPECIFIED POSTPROCEDURAL STATES: Chronic | ICD-10-CM

## 2025-06-12 DIAGNOSIS — Z90.5 ACQUIRED ABSENCE OF KIDNEY: Chronic | ICD-10-CM

## 2025-06-12 DIAGNOSIS — I86.1 SCROTAL VARICES: Chronic | ICD-10-CM

## 2025-06-12 LAB
ANION GAP SERPL CALC-SCNC: 7 MMOL/L — SIGNIFICANT CHANGE UP (ref 5–17)
BLD GP AB SCN SERPL QL: SIGNIFICANT CHANGE UP
BUN SERPL-MCNC: 19 MG/DL — SIGNIFICANT CHANGE UP (ref 7–23)
CALCIUM SERPL-MCNC: 8.5 MG/DL — SIGNIFICANT CHANGE UP (ref 8.4–10.5)
CHLORIDE SERPL-SCNC: 108 MMOL/L — SIGNIFICANT CHANGE UP (ref 96–108)
CO2 SERPL-SCNC: 25 MMOL/L — SIGNIFICANT CHANGE UP (ref 22–31)
CREAT SERPL-MCNC: 1.2 MG/DL — SIGNIFICANT CHANGE UP (ref 0.5–1.3)
EGFR: 73 ML/MIN/1.73M2 — SIGNIFICANT CHANGE UP
EGFR: 73 ML/MIN/1.73M2 — SIGNIFICANT CHANGE UP
GLUCOSE SERPL-MCNC: 117 MG/DL — HIGH (ref 70–99)
HCT VFR BLD CALC: 39.5 % — SIGNIFICANT CHANGE UP (ref 39–50)
HGB BLD-MCNC: 14 G/DL — SIGNIFICANT CHANGE UP (ref 13–17)
MCHC RBC-ENTMCNC: 29.5 PG — SIGNIFICANT CHANGE UP (ref 27–34)
MCHC RBC-ENTMCNC: 35.4 G/DL — SIGNIFICANT CHANGE UP (ref 32–36)
MCV RBC AUTO: 83.2 FL — SIGNIFICANT CHANGE UP (ref 80–100)
NRBC BLD AUTO-RTO: 0 /100 WBCS — SIGNIFICANT CHANGE UP (ref 0–0)
PLATELET # BLD AUTO: 148 K/UL — LOW (ref 150–400)
POTASSIUM SERPL-MCNC: 4.8 MMOL/L — SIGNIFICANT CHANGE UP (ref 3.5–5.3)
POTASSIUM SERPL-SCNC: 4.8 MMOL/L — SIGNIFICANT CHANGE UP (ref 3.5–5.3)
RBC # BLD: 4.75 M/UL — SIGNIFICANT CHANGE UP (ref 4.2–5.8)
RBC # FLD: 13.1 % — SIGNIFICANT CHANGE UP (ref 10.3–14.5)
SODIUM SERPL-SCNC: 140 MMOL/L — SIGNIFICANT CHANGE UP (ref 135–145)
WBC # BLD: 9.54 K/UL — SIGNIFICANT CHANGE UP (ref 3.8–10.5)
WBC # FLD AUTO: 9.54 K/UL — SIGNIFICANT CHANGE UP (ref 3.8–10.5)

## 2025-06-12 PROCEDURE — 88305 TISSUE EXAM BY PATHOLOGIST: CPT | Mod: 26

## 2025-06-12 PROCEDURE — 0421T: CPT

## 2025-06-12 PROCEDURE — 88344 IMHCHEM/IMCYTCHM EA MLT ANTB: CPT | Mod: 26

## 2025-06-12 RX ORDER — CEFTRIAXONE 500 MG/1
1000 INJECTION, POWDER, FOR SOLUTION INTRAMUSCULAR; INTRAVENOUS EVERY 24 HOURS
Refills: 0 | Status: ACTIVE | OUTPATIENT
Start: 2025-06-13 | End: 2025-06-15

## 2025-06-12 RX ORDER — SODIUM CHLORIDE 9 G/1000ML
500 INJECTION, SOLUTION INTRAVENOUS ONCE
Refills: 0 | Status: COMPLETED | OUTPATIENT
Start: 2025-06-12 | End: 2025-06-12

## 2025-06-12 RX ORDER — HYDROMORPHONE/SOD CHLOR,ISO/PF 2 MG/10 ML
1 SYRINGE (ML) INJECTION
Refills: 0 | Status: DISCONTINUED | OUTPATIENT
Start: 2025-06-12 | End: 2025-06-12

## 2025-06-12 RX ORDER — PHENAZOPYRIDINE HCL 100 MG
100 TABLET ORAL EVERY 8 HOURS
Refills: 0 | Status: ACTIVE | OUTPATIENT
Start: 2025-06-12 | End: 2025-06-15

## 2025-06-12 RX ORDER — SODIUM CHLORIDE 9 G/1000ML
1000 INJECTION, SOLUTION INTRAVENOUS
Refills: 0 | Status: ACTIVE | OUTPATIENT
Start: 2025-06-12 | End: 2026-05-11

## 2025-06-12 RX ORDER — LIDOCAINE HCL/PF 10 MG/ML
10 VIAL (ML) INJECTION ONCE
Refills: 0 | Status: COMPLETED | OUTPATIENT
Start: 2025-06-12 | End: 2025-06-12

## 2025-06-12 RX ORDER — OXYBUTYNIN CHLORIDE 5 MG/1
5 TABLET, FILM COATED, EXTENDED RELEASE ORAL EVERY 8 HOURS
Refills: 0 | Status: ACTIVE | OUTPATIENT
Start: 2025-06-12 | End: 2026-05-11

## 2025-06-12 RX ORDER — TRAMADOL HYDROCHLORIDE 50 MG/1
25 TABLET, FILM COATED ORAL EVERY 6 HOURS
Refills: 0 | Status: DISCONTINUED | OUTPATIENT
Start: 2025-06-12 | End: 2025-06-13

## 2025-06-12 RX ORDER — SODIUM CHLORIDE 9 G/1000ML
1000 INJECTION, SOLUTION INTRAVENOUS
Refills: 0 | Status: DISCONTINUED | OUTPATIENT
Start: 2025-06-12 | End: 2025-06-12

## 2025-06-12 RX ORDER — HYDROMORPHONE/SOD CHLOR,ISO/PF 2 MG/10 ML
0.5 SYRINGE (ML) INJECTION
Refills: 0 | Status: DISCONTINUED | OUTPATIENT
Start: 2025-06-12 | End: 2025-06-12

## 2025-06-12 RX ORDER — OXYCODONE HYDROCHLORIDE 30 MG/1
5 TABLET ORAL EVERY 6 HOURS
Refills: 0 | Status: DISCONTINUED | OUTPATIENT
Start: 2025-06-12 | End: 2025-06-12

## 2025-06-12 RX ORDER — ONDANSETRON HCL/PF 4 MG/2 ML
4 VIAL (ML) INJECTION ONCE
Refills: 0 | Status: DISCONTINUED | OUTPATIENT
Start: 2025-06-12 | End: 2025-06-12

## 2025-06-12 RX ORDER — ACETAMINOPHEN 500 MG/5ML
650 LIQUID (ML) ORAL EVERY 6 HOURS
Refills: 0 | Status: ACTIVE | OUTPATIENT
Start: 2025-06-12 | End: 2026-05-11

## 2025-06-12 RX ORDER — HEPARIN SODIUM 1000 [USP'U]/ML
5000 INJECTION INTRAVENOUS; SUBCUTANEOUS EVERY 8 HOURS
Refills: 0 | Status: ACTIVE | OUTPATIENT
Start: 2025-06-13 | End: 2026-05-12

## 2025-06-12 RX ADMIN — Medication 10 MILLILITER(S): at 15:30

## 2025-06-12 RX ADMIN — SODIUM CHLORIDE 1000 MILLILITER(S): 9 INJECTION, SOLUTION INTRAVENOUS at 14:40

## 2025-06-12 RX ADMIN — Medication 100 MILLIGRAM(S): at 21:13

## 2025-06-12 RX ADMIN — OXYBUTYNIN CHLORIDE 5 MILLIGRAM(S): 5 TABLET, FILM COATED, EXTENDED RELEASE ORAL at 15:21

## 2025-06-12 RX ADMIN — SODIUM CHLORIDE 50 MILLILITER(S): 9 INJECTION, SOLUTION INTRAVENOUS at 11:34

## 2025-06-12 RX ADMIN — TRAMADOL HYDROCHLORIDE 25 MILLIGRAM(S): 50 TABLET, FILM COATED ORAL at 18:43

## 2025-06-12 NOTE — DISCHARGE NOTE PROVIDER - CARE PROVIDER_API CALL
Eliot Cali [facility]  Urology  [address] [place] [id number]  Phone: [phone]  Fax: [phone]  Scheduled Appointment: 06/17/2025

## 2025-06-12 NOTE — DISCHARGE NOTE PROVIDER - NSDCFUADDINST_GEN_ALL_CORE_FT
CATHETER: Some patients are sent home with a spencer catheter, while others go home urinating on their own. If you still have a catheter, the nurses will review instructions and care before you go home. For men, you may have a prescription for lidocaine jelly to apply to the tip of your penis, as needed, for catheter related discomfort.  GENERAL: It is common to have blood in your urine after your procedure. It may be pink or even red; inform your doctor if you have a significant amount of clot in the urine or if you are unable to void at all or if your catheter stops draining. The urine may clear and then become bloody again especially as you are more physically active. It is not uncommon to have some burning when you urinate, this will gradually improve. With a catheter in place, it is not uncommon to have occasional leakage or urine or blood around the catheter. Please call your urologist if this is excessive and/or the urine is not draining through the catheter into the bag.  BATHING: You may shower or bathe. If going home with spencer, shower only until catheter is removed.  DIET: You may resume your regular diet and regular medication regimen.  PAIN: You may take Tylenol (acetaminophen) 650-975mg and/or Motrin (ibuprofen) 400-600mg, both available over the counter, for pain every 6 hours as needed. Do not exceed 4000mg of Tylenol (acetaminophen) daily. You may alternate these medications such that you take one or the other every 3 hours for around the clock pain coverage.  ANTIBIOTICS: You may be given a prescription for an antibiotic, please take this medication as instructed and be sure to complete the entire course.  STOOL SOFTENERS: Do not allow yourself to become constipated as straining may cause bleeding. Take stool softeners or a laxative (ex. Miralax, Colace, Senokot, ExLax, etc), available over the counter, if needed.  ACTIVITY: No heavy lifting or strenuous exercise until you are evaluated at your post-operative appointment. Otherwise, you may return to your usual level of physical activity.  ANTICOAGULATION: If you are taking any blood thinning medications, please discuss with your urologist prior to restarting these medications unless otherwise specified.  FOLLOW-UP: If you did not already schedule your post-operative appointment, please call your urologist to schedule and follow-up appointment.  CALL YOUR UROLOGIST IF: You have any bleeding that does not stop, inability to void >8 hours, fever over 100.4 F, chills, persistent nausea/vomiting, changes in your incision concerning for infection, or if your pain is not controlled on your discharge pain medications.   CATHETER: Some patients are sent home with a spencer catheter, while others go home urinating on their own. If you still have a catheter, the nurses will review instructions and care before you go home. For men, you may have a prescription for lidocaine jelly to apply to the tip of your penis, as needed, for catheter related discomfort.  GENERAL: It is common to have blood in your urine after your procedure. It may be pink or even red; inform your doctor if you have a significant amount of clot in the urine or if you are unable to void at all or if your catheter stops draining. The urine may clear and then become bloody again especially as you are more physically active. It is not uncommon to have some burning when you urinate, this will gradually improve. With a catheter in place, it is not uncommon to have occasional leakage or urine or blood around the catheter. Please call your urologist if this is excessive and/or the urine is not draining through the catheter into the bag.  BATHING: You may shower or bathe. If going home with spencer, shower only until catheter is removed.  DIET: You may resume your regular diet and regular medication regimen.  PAIN: You may take Tylenol (acetaminophen) 650-975mg and/or Motrin (ibuprofen) 400-600mg, both available over the counter, for pain every 6 hours as needed. Do not exceed 4000mg of Tylenol (acetaminophen) daily. You may alternate these medications such that you take one or the other every 3 hours for around the clock pain coverage.  ANTIBIOTICS: You will be given a prescription for an antibiotic, please take this medication as instructed and be sure to complete the entire course.  STOOL SOFTENERS: Do not allow yourself to become constipated as straining may cause bleeding. Take stool softeners or a laxative (ex. Miralax, Colace, Senokot, ExLax, etc), available over the counter, if needed.  ACTIVITY: No heavy lifting or strenuous exercise until you are evaluated at your post-operative appointment. Otherwise, you may return to your usual level of physical activity.  ANTICOAGULATION: If you are taking any blood thinning medications, please discuss with your urologist prior to restarting these medications unless otherwise specified.  FOLLOW-UP: If you did not already schedule your post-operative appointment, please call your urologist to schedule and follow-up appointment.  CALL YOUR UROLOGIST IF: You have any bleeding that does not stop, inability to void >8 hours, fever over 100.4 F, chills, persistent nausea/vomiting, changes in your incision concerning for infection, or if your pain is not controlled on your discharge pain medications.   CATHETER: Some patients are sent home with a spencer catheter, while others go home urinating on their own. If you still have a catheter, the nurses will review instructions and care before you go home. For men, you may have a prescription for lidocaine jelly to apply to the tip of your penis, as needed, for catheter related discomfort.  GENERAL: It is common to have blood in your urine after your procedure. It may be pink or even red; inform your doctor if you have a significant amount of clot in the urine or if you are unable to void at all or if your catheter stops draining. The urine may clear and then become bloody again especially as you are more physically active. It is not uncommon to have some burning when you urinate, this will gradually improve. With a catheter in place, it is not uncommon to have occasional leakage or urine or blood around the catheter. Please call your urologist if this is excessive and/or the urine is not draining through the catheter into the bag.  BATHING: You may shower or bathe. If going home with spencer, shower only until catheter is removed.  DIET: You may resume your regular diet and regular medication regimen.  PAIN: You may take Tylenol (acetaminophen) 650-975mg and/or Motrin (ibuprofen) 400-600mg, both available over the counter, for pain every 6 hours as needed. Do not exceed 4000mg of Tylenol (acetaminophen) daily. You may alternate these medications such that you take one or the other every 3 hours for around the clock pain coverage.  You will be sent home with oxybutynin. STOP TAKING THIS MEDICATION 24 HOURS PRIOR TO APPOINTMENT  ANTIBIOTICS: You will be given a prescription for an antibiotic, please take this medication as instructed and be sure to complete the entire course.  STOOL SOFTENERS: Do not allow yourself to become constipated as straining may cause bleeding. Take stool softeners or a laxative (ex. Miralax, Colace, Senokot, ExLax, etc), available over the counter, if needed.  ACTIVITY: No heavy lifting or strenuous exercise until you are evaluated at your post-operative appointment. Otherwise, you may return to your usual level of physical activity.  FOLLOW-UP: Tuesday at 1030 for spencer removal   CALL YOUR UROLOGIST IF: You have any bleeding that does not stop, inability to void >8 hours, fever over 100.4 F, chills, persistent nausea/vomiting, changes in your incision concerning for infection, or if your pain is not controlled on your discharge pain medications.

## 2025-06-12 NOTE — DISCHARGE NOTE PROVIDER - NSDCCPTREATMENT_GEN_ALL_CORE_FT
PRINCIPAL PROCEDURE  Procedure: Transurethral waterjet ablation of prostate  Findings and Treatment:

## 2025-06-12 NOTE — DISCHARGE NOTE PROVIDER - HOSPITAL COURSE
Hospital Course: 50 year old M with PMHx of triglycerides and bph admitted to [facility] 6/12 for planned procedure. Pt went to OR, now s/p TURP w/ aquablation w/ Dr. Cali. Operative course uneventful, pt recovered in PACU and went to surgical floor w/ CBI running. Admitted for monitoring overnight. CBI was titrated overnight to color, labs reviewed in AM.   POD1 pt was seen and examined by urologist/team, CBI clamped and urine running clear. Pt tolerating diet, voiding via catheter, pain controlled.  Pt was cleared for DC home with spencer catheter by attending Dr. Cali, pt understands and agreeable to plan

## 2025-06-12 NOTE — PATIENT PROFILE ADULT - STATED REASON FOR ADMISSION
"Prostate surgery for frequent urination at night" Pt admitted s/p Transurethral waterjet ablation of prostate

## 2025-06-12 NOTE — DISCHARGE NOTE PROVIDER - NSDCMRMEDTOKEN_GEN_ALL_CORE_FT
calamine topical lotion: Apply topically to affected area prn as needed for  rash Applied topically for itching from Poison Ivy Rash  gemfibrozil 600 mg oral tablet: 1 tab(s) orally 2 times a day  icosapent 1 g oral capsule: 2 cap(s) orally 2 times a day  tamsulosin 0.4 mg oral capsule: 1 cap(s) orally once a day   Bactrim  mg-160 mg oral tablet: 1 tab(s) orally 2 times a day  calamine topical lotion: Apply topically to affected area prn as needed for  rash Applied topically for itching from Poison Ivy Rash  gemfibrozil 600 mg oral tablet: 1 tab(s) orally 2 times a day  icosapent 1 g oral capsule: 2 cap(s) orally 2 times a day  lidocaine 2% topical gel with applicator: Apply topically to affected area every 4 hours  oxyBUTYnin 5 mg oral tablet: 1 tab(s) orally every 8 hours as needed for Bladder spasms Stop taking 24 hours prior to catheter removal  phenazopyridine 100 mg oral tablet: 1 tab(s) orally every 8 hours  traMADol 25 mg oral tablet: 1 tab(s) orally every 6 hours MDD: 4

## 2025-06-12 NOTE — BRIEF OPERATIVE NOTE - NSICDXBRIEFPREOP_GEN_ALL_CORE_FT
PRE-OP DIAGNOSIS:  BPH with obstruction/lower urinary tract symptoms 12-Jun-2025 13:55:47  Eliot Cali

## 2025-06-12 NOTE — PATIENT PROFILE ADULT - FALL HARM RISK - RISK INTERVENTIONS
Assistance OOB with selected safe patient handling equipment/Assistance with ambulation/Communicate Fall Risk and Risk Factors to all staff, patient, and family

## 2025-06-12 NOTE — DISCHARGE NOTE PROVIDER - NSDCFUSCHEDAPPT_GEN_ALL_CORE_FT
[facility]  UROLOGY [address] [facility]  Scheduled Appointment: 06/17/2025    Eliot Cali  [facility]  UROLOGY [address] [facility]  Scheduled Appointment: 06/17/2025

## 2025-06-12 NOTE — ASU PATIENT PROFILE, ADULT - FALL HARM RISK - UNIVERSAL INTERVENTIONS
Bed in lowest position, wheels locked, appropriate side rails in place/Call bell, personal items and telephone in reach/Instruct patient to call for assistance before getting out of bed or chair/Non-slip footwear when patient is out of bed/[place] to call system/Physically safe environment - no spills, clutter or unnecessary equipment/Purposeful Proactive Rounding/Room/bathroom lighting operational, light cord in reach

## 2025-06-12 NOTE — BRIEF OPERATIVE NOTE - OPERATION/FINDINGS
Tri lobar prostatic enlargement: moderate enlargement of lateral lobes with coaptation and medium enlargement of median lobe

## 2025-06-12 NOTE — BRIEF OPERATIVE NOTE - NSICDXBRIEFPOSTOP_GEN_ALL_CORE_FT
POST-OP DIAGNOSIS:  BPH with obstruction/lower urinary tract symptoms 12-Jun-2025 13:55:55  Eliot Cali

## 2025-06-12 NOTE — ASU PATIENT PROFILE, ADULT - NS PRO ABUSE SCREEN SUSPICION NEGLECT YN
Sarah Avila)  Internal Medicine  [address] [place] [address]  Phone: [phone]  Fax: [phone]  Follow Up Time:     Pallavi Aguiar)  Cardiovascular Disease; Internal Medicine  [address], [address]  [place] [address]  Phone: [phone]  Fax: [phone]  Follow Up Time:   
no

## 2025-06-12 NOTE — ASU PREOP CHECKLIST - 1.
Pt reports having Poison Ivy on both forearms and hands with rash and itchiness, no blisters, no drainage

## 2025-06-12 NOTE — BRIEF OPERATIVE NOTE - BRIEF OP NOTE DRAINS
S/P CABG (coronary artery bypass graft)    S/P drug eluting coronary stent placement    Stenosis of right carotid artery  s/p endartectomy
22 Fr [address] [place].   Irrigation port on CBI.   Drainage port attached to bag.

## 2025-06-13 ENCOUNTER — TRANSCRIPTION ENCOUNTER (OUTPATIENT)
Age: 50
End: 2025-06-13

## 2025-06-13 VITALS
TEMPERATURE: 98 F | HEART RATE: 80 BPM | DIASTOLIC BLOOD PRESSURE: 65 MMHG | SYSTOLIC BLOOD PRESSURE: 118 MMHG | RESPIRATION RATE: 18 BRPM | OXYGEN SATURATION: 98 %

## 2025-06-13 LAB
ANION GAP SERPL CALC-SCNC: 7 MMOL/L — SIGNIFICANT CHANGE UP (ref 5–17)
BASOPHILS # BLD AUTO: 0.01 K/UL — SIGNIFICANT CHANGE UP (ref 0–0.2)
BASOPHILS NFR BLD AUTO: 0.1 % — SIGNIFICANT CHANGE UP (ref 0–2)
BUN SERPL-MCNC: 22 MG/DL — SIGNIFICANT CHANGE UP (ref 7–23)
CALCIUM SERPL-MCNC: 8.8 MG/DL — SIGNIFICANT CHANGE UP (ref 8.4–10.5)
CHLORIDE SERPL-SCNC: 105 MMOL/L — SIGNIFICANT CHANGE UP (ref 96–108)
CO2 SERPL-SCNC: 27 MMOL/L — SIGNIFICANT CHANGE UP (ref 22–31)
CREAT SERPL-MCNC: 1.18 MG/DL — SIGNIFICANT CHANGE UP (ref 0.5–1.3)
EGFR: 74 ML/MIN/1.73M2 — SIGNIFICANT CHANGE UP
EGFR: 74 ML/MIN/1.73M2 — SIGNIFICANT CHANGE UP
EOSINOPHIL # BLD AUTO: 0 K/UL — SIGNIFICANT CHANGE UP (ref 0–0.5)
EOSINOPHIL NFR BLD AUTO: 0 % — SIGNIFICANT CHANGE UP (ref 0–6)
GLUCOSE SERPL-MCNC: 127 MG/DL — HIGH (ref 70–99)
HCT VFR BLD CALC: 37.5 % — LOW (ref 39–50)
HGB BLD-MCNC: 13.3 G/DL — SIGNIFICANT CHANGE UP (ref 13–17)
IMM GRANULOCYTES NFR BLD AUTO: 0.3 % — SIGNIFICANT CHANGE UP (ref 0–0.9)
LYMPHOCYTES # BLD AUTO: 0.97 K/UL — LOW (ref 1–3.3)
LYMPHOCYTES # BLD AUTO: 8.3 % — LOW (ref 13–44)
MCHC RBC-ENTMCNC: 29.4 PG — SIGNIFICANT CHANGE UP (ref 27–34)
MCHC RBC-ENTMCNC: 35.5 G/DL — SIGNIFICANT CHANGE UP (ref 32–36)
MCV RBC AUTO: 83 FL — SIGNIFICANT CHANGE UP (ref 80–100)
MONOCYTES # BLD AUTO: 0.51 K/UL — SIGNIFICANT CHANGE UP (ref 0–0.9)
MONOCYTES NFR BLD AUTO: 4.4 % — SIGNIFICANT CHANGE UP (ref 2–14)
NEUTROPHILS # BLD AUTO: 10.13 K/UL — HIGH (ref 1.8–7.4)
NEUTROPHILS NFR BLD AUTO: 86.9 % — HIGH (ref 43–77)
NRBC BLD AUTO-RTO: 0 /100 WBCS — SIGNIFICANT CHANGE UP (ref 0–0)
PLATELET # BLD AUTO: 160 K/UL — SIGNIFICANT CHANGE UP (ref 150–400)
POTASSIUM SERPL-MCNC: 4.6 MMOL/L — SIGNIFICANT CHANGE UP (ref 3.5–5.3)
POTASSIUM SERPL-SCNC: 4.6 MMOL/L — SIGNIFICANT CHANGE UP (ref 3.5–5.3)
RBC # BLD: 4.52 M/UL — SIGNIFICANT CHANGE UP (ref 4.2–5.8)
RBC # FLD: 12.6 % — SIGNIFICANT CHANGE UP (ref 10.3–14.5)
SODIUM SERPL-SCNC: 139 MMOL/L — SIGNIFICANT CHANGE UP (ref 135–145)
WBC # BLD: 11.65 K/UL — HIGH (ref 3.8–10.5)
WBC # FLD AUTO: 11.65 K/UL — HIGH (ref 3.8–10.5)

## 2025-06-13 PROCEDURE — C2596: CPT

## 2025-06-13 PROCEDURE — C9399: CPT

## 2025-06-13 PROCEDURE — 88344 IMHCHEM/IMCYTCHM EA MLT ANTB: CPT

## 2025-06-13 PROCEDURE — 99232 SBSQ HOSP IP/OBS MODERATE 35: CPT

## 2025-06-13 PROCEDURE — 85025 COMPLETE CBC W/AUTO DIFF WBC: CPT

## 2025-06-13 PROCEDURE — 86901 BLOOD TYPING SEROLOGIC RH(D): CPT

## 2025-06-13 PROCEDURE — 88305 TISSUE EXAM BY PATHOLOGIST: CPT

## 2025-06-13 PROCEDURE — 80048 BASIC METABOLIC PNL TOTAL CA: CPT

## 2025-06-13 PROCEDURE — 36415 COLL VENOUS BLD VENIPUNCTURE: CPT

## 2025-06-13 PROCEDURE — 86850 RBC ANTIBODY SCREEN: CPT

## 2025-06-13 PROCEDURE — 86900 BLOOD TYPING SEROLOGIC ABO: CPT

## 2025-06-13 PROCEDURE — 85027 COMPLETE CBC AUTOMATED: CPT

## 2025-06-13 PROCEDURE — 0421T: CPT

## 2025-06-13 RX ORDER — TRAMADOL HYDROCHLORIDE 50 MG/1
1 TABLET, FILM COATED ORAL
Qty: 5 | Refills: 0
Start: 2025-06-13

## 2025-06-13 RX ORDER — PHENAZOPYRIDINE HCL 100 MG
1 TABLET ORAL
Qty: 6 | Refills: 0
Start: 2025-06-13

## 2025-06-13 RX ORDER — LIDOCAINE HYDROCHLORIDE 20 MG/ML
3 JELLY TOPICAL
Qty: 1 | Refills: 1
Start: 2025-06-13

## 2025-06-13 RX ORDER — OXYBUTYNIN CHLORIDE 5 MG/1
1 TABLET, FILM COATED, EXTENDED RELEASE ORAL
Qty: 12 | Refills: 0
Start: 2025-06-13

## 2025-06-13 RX ORDER — SIMETHICONE 80 MG
80 TABLET,CHEWABLE ORAL ONCE
Refills: 0 | Status: ACTIVE | OUTPATIENT
Start: 2025-06-13 | End: 2026-05-12

## 2025-06-13 RX ORDER — SULFAMETHOXAZOLE/TRIMETHOPRIM 800-160 MG
1 TABLET ORAL
Qty: 10 | Refills: 0
Start: 2025-06-13

## 2025-06-13 RX ADMIN — HEPARIN SODIUM 5000 UNIT(S): 1000 INJECTION INTRAVENOUS; SUBCUTANEOUS at 05:13

## 2025-06-13 RX ADMIN — CEFTRIAXONE 100 MILLIGRAM(S): 500 INJECTION, POWDER, FOR SOLUTION INTRAMUSCULAR; INTRAVENOUS at 05:14

## 2025-06-13 RX ADMIN — Medication 100 MILLIGRAM(S): at 05:13

## 2025-06-13 RX ADMIN — TRAMADOL HYDROCHLORIDE 25 MILLIGRAM(S): 50 TABLET, FILM COATED ORAL at 12:10

## 2025-06-13 NOTE — PROGRESS NOTE ADULT - SUBJECTIVE AND OBJECTIVE BOX
SURGERY PROGRESS NOTE  Pt. seen and examined at bedside resting comfortably. HAILEE. Pt tolerating CBI well, pain controlled. Denies abdominal pain. Tolerating diet, denies N/V. Admits to pain at meatus due to spencer.  Denies fever/chills, chest pain, dyspnea, cough, dizziness.     Vital Signs Last 24 Hrs  T(C): 36.4 (12 Jun 2025 16:30), Max: 36.7 (12 Jun 2025 11:07)  T(F): 97.5 (12 Jun 2025 16:30), Max: 98.1 (12 Jun 2025 11:07)  HR: 69 (12 Jun 2025 16:30) (68 - 95)  BP: 97/63 (12 Jun 2025 16:30) (88/65 - 126/89)  BP(mean): 77 (12 Jun 2025 16:00) (72 - 78)  RR: 16 (12 Jun 2025 16:30) (12 - 17)  SpO2: 96% (12 Jun 2025 16:30) (95% - 98%)    Parameters below as of 12 Jun 2025 16:00  Patient On (Oxygen Delivery Method): room air    PHYSICAL EXAM:    GENERAL: NAD  HEAD:  Atraumatic, Normocephalic  EYES: EOMI, PERRLA, conjunctiva and sclera clear  ABDOMEN: soft, ND, NT  : spencer in place, CBI running high flow clear no clots  EXTREMITIES:  calf soft, non tender b/l    I&O's Detail    12 Jun 2025 07:01  -  12 Jun 2025 21:17  --------------------------------------------------------  IN:    Continuous Bladder Irrigation (mL): 38973 mL    Oral Fluid: 240 mL  Total IN: 59772 mL    OUT:    Continuous Bladder Irrigation (mL): 8200 mL  Total OUT: 8200 mL    Total NET: 41758 mL          LABS:                        14.0   9.54  )-----------( 148      ( 12 Jun 2025 15:00 )             39.5     06-12    140  |  108  |  19  ----------------------------<  117[H]  4.8   |  25  |  1.20    Ca    8.5      12 Jun 2025 15:00        Urinalysis Basic - ( 12 Jun 2025 15:00 )    Color: x / Appearance: x / SG: x / pH: x  Gluc: 117 mg/dL / Ketone: x  / Bili: x / Urobili: x   Blood: x / Protein: x / Nitrite: x   Leuk Esterase: x / RBC: x / WBC x   Sq Epi: x / Non Sq Epi: x / Bacteria: x        type    RADIOLOGY & ADDITIONAL STUDIES:    Impression: 50y Male POD0 s/p TURP aquablation. Pt recovering well, HD stable. BP low in pacu, improved now. Asymptomatic. Tolerating diet. CBI running high, titrated to 3/4 open w/ clear flow.     Plan:  - C/w CBI overnight, titrate to clear  - Irrigate prn  - Post op abx  - Oxybutynin prn for spasms  - pyridium Q8  - keep spencer on traction overnight  - SQH in AM  - Plan for clamp CBI in AM, anticipate dc home     To be discussed w/ Dr. Cali  Urology
Subjective  feeling bloated attempted bm without success , catheter discomfort   Objective    Vital signs  T(F): , Max: 98.1 (06-12-25 @ 11:07)  HR: 76 (06-13-25 @ 06:11)  BP: 125/69 (06-13-25 @ 06:11)  SpO2: 95% (06-13-25 @ 06:11)  Wt(kg): --    Output     06-12 @ 07:01  -  06-13 @ 07:00  --------------------------------------------------------  IN: 94496 mL / OUT: 94156 mL / NET: 9115 mL        Gen awake alert nad axox3  Abd flat soft ntnd   Back no cvat bl    foely secure urine yellow on medium cbi     Labs                            13.3   11.65 )-----------( 160      ( 13 Jun 2025 06:33 )             37.5     06-13    139  |  105  |  22  ----------------------------<  127[H]  4.6   |  27  |  1.18    Ca    8.8      13 Jun 2025 06:33        06-12 @ 15:00    WBC 9.54  / Hct 39.5  / SCr 1.20

## 2025-06-13 NOTE — DISCHARGE NOTE NURSING/CASE MANAGEMENT/SOCIAL WORK - PATIENT PORTAL LINK FT
You can access the FollowMyHealth Patient Portal offered by [facility] by registering at the following website: [email or web address]. By joining [facility]’s FollowMyHealth portal, you will also be able to view your health information using other applications (apps) compatible with our system.

## 2025-06-13 NOTE — DISCHARGE NOTE NURSING/CASE MANAGEMENT/SOCIAL WORK - FINANCIAL ASSISTANCE
[facility] provides services at a reduced cost to those who are determined to be eligible through [facility]’s financial assistance program. Information regarding [facility]’s financial assistance program can be found by going to [email or web address] or by calling [phone].

## 2025-06-13 NOTE — PROGRESS NOTE ADULT - ASSESSMENT
Impression: 50y Male s/p Aquablation 6/12   :  cbi weaned overnight to slow  traction decreased   anticipate clamping cbi this am   oob at breakfast     - Post op abx  - Oxybutynin prn for spasms  - pyridium Q8  - SQH in AM    anticipate dc home with tj peña

## 2025-06-15 DIAGNOSIS — N40.1 BENIGN PROSTATIC HYPERPLASIA WITH LOWER URINARY TRACT SYMPTOMS: ICD-10-CM

## 2025-06-16 ENCOUNTER — APPOINTMENT (OUTPATIENT)
Dept: UROLOGY | Facility: CLINIC | Age: 50
End: 2025-06-16
Payer: COMMERCIAL

## 2025-06-16 VITALS — BODY MASS INDEX: 28.19 KG/M2 | WEIGHT: 180 LBS

## 2025-06-16 PROCEDURE — 51700 IRRIGATION OF BLADDER: CPT

## 2025-06-16 PROCEDURE — A4216: CPT | Mod: NC

## 2025-06-17 ENCOUNTER — APPOINTMENT (OUTPATIENT)
Dept: UROLOGY | Facility: CLINIC | Age: 50
End: 2025-06-17
Payer: COMMERCIAL

## 2025-06-17 VITALS
RESPIRATION RATE: 16 BRPM | HEART RATE: 90 BPM | BODY MASS INDEX: 27.47 KG/M2 | DIASTOLIC BLOOD PRESSURE: 86 MMHG | SYSTOLIC BLOOD PRESSURE: 128 MMHG | HEIGHT: 67 IN | WEIGHT: 175 LBS

## 2025-06-17 PROCEDURE — 99213 OFFICE O/P EST LOW 20 MIN: CPT

## 2025-06-24 LAB — SURGICAL PATHOLOGY STUDY: SIGNIFICANT CHANGE UP

## 2025-07-01 ENCOUNTER — APPOINTMENT (OUTPATIENT)
Dept: UROLOGY | Facility: CLINIC | Age: 50
End: 2025-07-01

## 2025-07-01 VITALS
WEIGHT: 175 LBS | HEART RATE: 67 BPM | BODY MASS INDEX: 27.41 KG/M2 | DIASTOLIC BLOOD PRESSURE: 103 MMHG | SYSTOLIC BLOOD PRESSURE: 148 MMHG

## 2025-07-01 PROCEDURE — 99213 OFFICE O/P EST LOW 20 MIN: CPT | Mod: 25

## 2025-07-01 PROCEDURE — 51798 US URINE CAPACITY MEASURE: CPT

## 2025-07-01 PROCEDURE — 51741 ELECTRO-UROFLOWMETRY FIRST: CPT

## 2025-08-05 ENCOUNTER — APPOINTMENT (OUTPATIENT)
Dept: UROLOGY | Facility: CLINIC | Age: 50
End: 2025-08-05

## 2025-08-05 VITALS
WEIGHT: 185 LBS | BODY MASS INDEX: 29.03 KG/M2 | RESPIRATION RATE: 16 BRPM | HEART RATE: 93 BPM | OXYGEN SATURATION: 98 % | HEIGHT: 67 IN | SYSTOLIC BLOOD PRESSURE: 132 MMHG | DIASTOLIC BLOOD PRESSURE: 98 MMHG

## 2025-08-05 DIAGNOSIS — N40.1 BENIGN PROSTATIC HYPERPLASIA WITH LOWER URINARY TRACT SYMPMS: ICD-10-CM

## 2025-08-05 DIAGNOSIS — N13.8 BENIGN PROSTATIC HYPERPLASIA WITH LOWER URINARY TRACT SYMPMS: ICD-10-CM

## 2025-08-05 PROCEDURE — 51798 US URINE CAPACITY MEASURE: CPT

## 2025-08-05 PROCEDURE — 99213 OFFICE O/P EST LOW 20 MIN: CPT | Mod: 25

## 2025-08-06 LAB
APPEARANCE: CLEAR
BACTERIA: NEGATIVE /HPF
BILIRUBIN URINE: NEGATIVE
BLOOD URINE: NEGATIVE
CAST: 0 /LPF
COLOR: YELLOW
EPITHELIAL CELLS: 0 /HPF
GLUCOSE QUALITATIVE U: NEGATIVE MG/DL
KETONES URINE: NEGATIVE MG/DL
LEUKOCYTE ESTERASE URINE: ABNORMAL
MICROSCOPIC-UA: NORMAL
NITRITE URINE: NEGATIVE
PH URINE: 6
PROTEIN URINE: NEGATIVE MG/DL
RED BLOOD CELLS URINE: 0 /HPF
REVIEW: NORMAL
SPECIFIC GRAVITY URINE: 1.01
UROBILINOGEN URINE: 0.2 MG/DL
WHITE BLOOD CELLS URINE: 2 /HPF

## 2025-08-08 LAB — BACTERIA UR CULT: NORMAL

## (undated) DEVICE — FOLEY CATH 3-WAY 22FR 30CC LATEX HEMATURIA COUDE

## (undated) DEVICE — SYR TOOMEY 50ML

## (undated) DEVICE — VENODYNE/SCD SLEEVE CALF MEDIUM

## (undated) DEVICE — TUBING RANGER FLUID IRRIGATION SET DISP

## (undated) DEVICE — FOLEY CATH 3-WAY 24FR 30CC LATEX HEMATURIA COUDE

## (undated) DEVICE — ELCTR PLASMA BUTTON 24FR 12-30 DEG

## (undated) DEVICE — DRAPE 1/2 SHEET 40X57"

## (undated) DEVICE — DRAPE LITHOTOMY SMS POLY 53X105IN

## (undated) DEVICE — GOWN SURGEON VEST

## (undated) DEVICE — ULTRASOUND PROBE COVER 5.5 X 96"

## (undated) DEVICE — DRSG TEGADERM 4 X 4.75"

## (undated) DEVICE — GEL AQUSNC PACKET 20GR

## (undated) DEVICE — DRAINAGE BAG URINARY 4L

## (undated) DEVICE — ELCTR PLASMA LOOP MEDIUM ANGLED 24FR 12-30 DEG

## (undated) DEVICE — TAPE SILK 3"

## (undated) DEVICE — FOLEY CATH 3-WAY 24FR 30CC LATEX LUBRICATH

## (undated) DEVICE — GLV 7.5 PROTEXIS (WHITE)

## (undated) DEVICE — SOL IRR BAG NS 0.9% 3000ML

## (undated) DEVICE — DRAPE EQUIPMENT BANDED BAG 30 X 30" (SHOWER CAP)

## (undated) DEVICE — FOLEY CATH 3-WAY 22FR 30CC LATEX LUBRICATH

## (undated) DEVICE — DRAPE AQUABEAM PK MULTIPLES OF 5

## (undated) DEVICE — SYR LUER LOK 50CC

## (undated) DEVICE — FOLEY CATH 3-WAY 20FR 30CC LATEX LUBRICATH

## (undated) DEVICE — DRAPE MAYO STAND 30"

## (undated) DEVICE — ELCTR PLASMA BUTTON OVAL 24FR 12-30 DEG

## (undated) DEVICE — HANDPIECE AQUABEAM

## (undated) DEVICE — PACK CYSTO

## (undated) DEVICE — FOLEY CATH 3-WAY 20FR 30CC LATEX HEMATURIA COUDE

## (undated) DEVICE — TUBING TUR 2 PRONG

## (undated) DEVICE — UROVAC